# Patient Record
Sex: FEMALE | Race: BLACK OR AFRICAN AMERICAN
[De-identification: names, ages, dates, MRNs, and addresses within clinical notes are randomized per-mention and may not be internally consistent; named-entity substitution may affect disease eponyms.]

---
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
## 2017-07-14 NOTE — NUR
41Y/F BIB PD TO ED FOR PREBOOK. PER PD;PT SMOKE PCP LAST WEEK, BROUGHT PT. FOR 
MEDICAL CLEARANCE. PT. AAO X4, AMBULATORY WITH STEDAY GAIT. NO S/SX OF DISTRESS 
AT THIS TIME. ER MD MADE AWARE OF PT. STATUS.

## 2019-02-22 NOTE — NUR
PATIENT CONSTANTLY ON THE PHONE TALKING TRYING TO TALK TO SOMEONE TO PICK HER 
UP. SHE KEEPS SAYING SHE WANTS TO GO HOME

## 2019-02-22 NOTE — NUR
44 YO F [facility]IB[facility] AFTER [employer] CALLED FROM [employer] AFTER 
PT WAS YELLING OUTSIDE. PT EVALUATED BY MAKENZIE LAIRD, NOT ON A HOLD. PT HERE 
FOR EVALUATION. PT YELLING AND UPSET ABOUT BEING HERE. MD AWARE. PT ENCOURAGED 
TO BE SEEN. PT HAS FAMILY ON THE WAY TO PICK HER UP. PT ABLE TO BE SETTLED INTO 
A BED. AAOX4.  PATIENT REFUESING TO GIVE URINE AT THIS TIME.

## 2019-02-22 NOTE — NUR
CALLED AND SPOKE TO JONATHAN [phone] PATIENTS FRIEND, JONATHAN WILLING TO  
PATIENT. PATIENT INFORMED
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
## 2019-11-19 NOTE — NUR
Patient discharged with v/s stable. Written and verbal after care instructions 
given and explained. Pt encouraged to drink plenty of fluids until urine is 
clear. Pt instructed to take all antibiotics as prescribed. Patient alert, 
oriented and verbalized understanding of instructions. Ambulatory with steady 
gait. All questions addressed prior to discharge. ID band removed. Patient 
advised to follow up with PMD. Rx of KEFLEX 500MG WAS given. Patient educated 
on indication of medication including possible reaction and side effects. 
Opportunity to ask questions provided and answered.

## 2022-10-23 ENCOUNTER — HOSPITAL ENCOUNTER (EMERGENCY)
Dept: HOSPITAL 26 - MED | Age: 47
Discharge: HOME | End: 2022-10-23
Payer: COMMERCIAL

## 2022-10-23 VITALS — SYSTOLIC BLOOD PRESSURE: 140 MMHG | DIASTOLIC BLOOD PRESSURE: 95 MMHG

## 2022-10-23 VITALS — DIASTOLIC BLOOD PRESSURE: 85 MMHG | SYSTOLIC BLOOD PRESSURE: 142 MMHG

## 2022-10-23 VITALS — BODY MASS INDEX: 22.73 KG/M2 | WEIGHT: 150 LBS | HEIGHT: 68 IN

## 2022-10-23 DIAGNOSIS — N39.0: ICD-10-CM

## 2022-10-23 DIAGNOSIS — N12: Primary | ICD-10-CM

## 2022-10-23 LAB
APPEARANCE UR: (no result)
BILIRUB UR QL STRIP: NEGATIVE
COLOR UR: YELLOW
GLUCOSE UR STRIP-MCNC: NEGATIVE MG/DL
HGB UR QL STRIP: (no result)
LEUKOCYTE ESTERASE UR QL STRIP: (no result)
NITRITE UR QL STRIP: POSITIVE
PH UR STRIP: 6 [PH] (ref 5–9)
RBC #/AREA URNS HPF: (no result) /HPF (ref 0–5)
WBC,URINE: (no result) /HPF (ref 0–5)
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
## 2022-10-23 NOTE — NUR
Patient discharged with v/s stable. Written and verbal after care instructions 
ABOUT UTI given and explained. 

Patient alert, oriented and verbalized understanding of instructions. 
Ambulatory with steady gait. All questions addressed prior to discharge. ID 
band removed. Patient advised to follow up with PMD. Rx of KELFEX given. 
Patient educated on indication of medication including possible reaction and 
side effects. Opportunity to ask questions provided and answered.

## 2023-06-22 ENCOUNTER — HOSPITAL ENCOUNTER (EMERGENCY)
Dept: HOSPITAL 26 - MED | Age: 48
Discharge: HOME | End: 2023-06-22
Payer: COMMERCIAL

## 2023-06-22 VITALS — DIASTOLIC BLOOD PRESSURE: 63 MMHG | SYSTOLIC BLOOD PRESSURE: 99 MMHG

## 2023-06-22 VITALS — WEIGHT: 151 LBS | HEIGHT: 68 IN | BODY MASS INDEX: 22.88 KG/M2

## 2023-06-22 DIAGNOSIS — Z86.69: ICD-10-CM

## 2023-06-22 DIAGNOSIS — Z98.890: ICD-10-CM

## 2023-06-22 DIAGNOSIS — N30.91: Primary | ICD-10-CM

## 2023-06-22 DIAGNOSIS — Z79.2: ICD-10-CM

## 2023-06-22 LAB
APPEARANCE UR: CLEAR
BILIRUB UR QL STRIP: NEGATIVE
COLOR UR: YELLOW
GLUCOSE UR STRIP-MCNC: NEGATIVE MG/DL
HGB UR QL STRIP: (no result)
LEUKOCYTE ESTERASE UR QL STRIP: (no result)
NITRITE UR QL STRIP: NEGATIVE
PH UR STRIP: 6 [PH] (ref 5–9)
RBC #/AREA URNS HPF: (no result) /HPF (ref 0–5)

## 2023-06-22 NOTE — NUR
46 Y/O FEMALE BIB SELF, C/O HEMATURIA FOR 2 DAYS WITH FEVER 102F AND BODY 
ACHES. PT STATES SHE STARTED HAVING RIGHT FLANK PAIN TODAY . DENIES N/V/D; SKIN 
IS PINK/WARM/DRY; AAOX4 WITH EVEN AND STEADY GAIT; LUNGS CLEAR BL; HR EVEN AND 
REGULAR; PATIENT POSITIONED FOR COMFORT; HOB ELEVATED; BEDRAILS UP X2; BED 
DOWN. ER MD MADE AWARE OF PT STATUS. DENIES VAGINAL DISCHARGE, DYSURIA, 
CRAMPING.



PMH: DENIES

NKA